# Patient Record
Sex: FEMALE | Race: WHITE | NOT HISPANIC OR LATINO | Employment: UNEMPLOYED | URBAN - NONMETROPOLITAN AREA
[De-identification: names, ages, dates, MRNs, and addresses within clinical notes are randomized per-mention and may not be internally consistent; named-entity substitution may affect disease eponyms.]

---

## 2023-04-26 ENCOUNTER — HOSPITAL ENCOUNTER (OUTPATIENT)
Dept: RADIOLOGY | Facility: HOSPITAL | Age: 58
Discharge: HOME | End: 2023-04-26
Attending: INTERNAL MEDICINE
Payer: OTHER GOVERNMENT

## 2023-04-26 DIAGNOSIS — R94.39 ABNORMAL BALLISTOCARDIOGRAM: ICD-10-CM

## 2023-04-26 PROCEDURE — 75572 CT HRT W/3D IMAGE: CPT | Mod: TC

## 2023-04-26 PROCEDURE — 25500020 PHARM REV CODE 255: Performed by: INTERNAL MEDICINE

## 2023-04-26 RX ADMIN — IOPAMIDOL 90 ML: 755 INJECTION, SOLUTION INTRAVENOUS at 10:04

## 2023-05-10 ENCOUNTER — HOSPITAL ENCOUNTER (OUTPATIENT)
Dept: RADIOLOGY | Facility: HOSPITAL | Age: 58
Discharge: HOME | End: 2023-05-10
Attending: INTERNAL MEDICINE
Payer: OTHER GOVERNMENT

## 2023-05-10 DIAGNOSIS — R42 DIZZINESS AND GIDDINESS: ICD-10-CM

## 2023-05-10 PROCEDURE — 25500020 PHARM REV CODE 255: Performed by: INTERNAL MEDICINE

## 2023-05-10 PROCEDURE — 75572 CT HRT W/3D IMAGE: CPT | Mod: TC

## 2023-05-10 RX ADMIN — IOPAMIDOL 90 ML: 755 INJECTION, SOLUTION INTRAVENOUS at 01:05

## 2023-06-30 DIAGNOSIS — R06.02 SOB (SHORTNESS OF BREATH): Primary | ICD-10-CM

## 2023-09-21 ENCOUNTER — HOSPITAL ENCOUNTER (OUTPATIENT)
Dept: RADIOLOGY | Facility: CLINIC | Age: 58
Discharge: HOME | End: 2023-09-21
Attending: INTERNAL MEDICINE
Payer: OTHER GOVERNMENT

## 2023-09-21 ENCOUNTER — CLINICAL SUPPORT (OUTPATIENT)
Dept: PULMONOLOGY | Facility: CLINIC | Age: 58
End: 2023-09-21
Payer: OTHER GOVERNMENT

## 2023-09-21 ENCOUNTER — OFFICE VISIT (OUTPATIENT)
Dept: PULMONOLOGY | Facility: CLINIC | Age: 58
End: 2023-09-21
Payer: OTHER GOVERNMENT

## 2023-09-21 VITALS
DIASTOLIC BLOOD PRESSURE: 79 MMHG | WEIGHT: 187.38 LBS | SYSTOLIC BLOOD PRESSURE: 128 MMHG | HEART RATE: 92 BPM | OXYGEN SATURATION: 97 % | HEIGHT: 63 IN | RESPIRATION RATE: 17 BRPM | BODY MASS INDEX: 33.2 KG/M2

## 2023-09-21 DIAGNOSIS — J98.4 PULMONARY SCARRING: ICD-10-CM

## 2023-09-21 DIAGNOSIS — R94.2 RESTRICTIVE PATTERN PRESENT ON PULMONARY FUNCTION TESTING: ICD-10-CM

## 2023-09-21 DIAGNOSIS — R53.83 FATIGUE, UNSPECIFIED TYPE: ICD-10-CM

## 2023-09-21 DIAGNOSIS — J47.9 FOCAL BRONCHIECTASIS: ICD-10-CM

## 2023-09-21 DIAGNOSIS — R06.02 SHORTNESS OF BREATH: Primary | ICD-10-CM

## 2023-09-21 DIAGNOSIS — R06.02 SOB (SHORTNESS OF BREATH): ICD-10-CM

## 2023-09-21 DIAGNOSIS — E66.9 OBESITY, UNSPECIFIED CLASSIFICATION, UNSPECIFIED OBESITY TYPE, UNSPECIFIED WHETHER SERIOUS COMORBIDITY PRESENT: ICD-10-CM

## 2023-09-21 DIAGNOSIS — J84.9 INTERSTITIAL PULMONARY DISEASE, UNSPECIFIED: ICD-10-CM

## 2023-09-21 DIAGNOSIS — G47.33 OBSTRUCTIVE SLEEP APNEA: ICD-10-CM

## 2023-09-21 DIAGNOSIS — R06.02 SOB (SHORTNESS OF BREATH): Primary | ICD-10-CM

## 2023-09-21 PROCEDURE — 94726 PLETHYSMOGRAPHY LUNG VOLUMES: CPT | Mod: S$GLB,,, | Performed by: INTERNAL MEDICINE

## 2023-09-21 PROCEDURE — 71046 X-RAY EXAM CHEST 2 VIEWS: CPT | Mod: TC,,, | Performed by: INTERNAL MEDICINE

## 2023-09-21 PROCEDURE — 94729 DIFFUSING CAPACITY: CPT | Mod: S$GLB,,, | Performed by: INTERNAL MEDICINE

## 2023-09-21 PROCEDURE — 71046 XR CHEST PA AND LATERAL: ICD-10-PCS | Mod: TC,,, | Performed by: INTERNAL MEDICINE

## 2023-09-21 PROCEDURE — 71046 XR CHEST PA AND LATERAL: ICD-10-PCS | Mod: 26,,, | Performed by: RADIOLOGY

## 2023-09-21 PROCEDURE — 94726 PULM FUNCT TST PLETHYSMOGRAP: ICD-10-PCS | Mod: S$GLB,,, | Performed by: INTERNAL MEDICINE

## 2023-09-21 PROCEDURE — 99205 OFFICE O/P NEW HI 60 MIN: CPT | Mod: 25,S$GLB,, | Performed by: INTERNAL MEDICINE

## 2023-09-21 PROCEDURE — 94060 PR EVAL OF BRONCHOSPASM: ICD-10-PCS | Mod: S$GLB,,, | Performed by: INTERNAL MEDICINE

## 2023-09-21 PROCEDURE — 71046 X-RAY EXAM CHEST 2 VIEWS: CPT | Mod: 26,,, | Performed by: RADIOLOGY

## 2023-09-21 PROCEDURE — 94060 EVALUATION OF WHEEZING: CPT | Mod: S$GLB,,, | Performed by: INTERNAL MEDICINE

## 2023-09-21 PROCEDURE — 94729 PR C02/MEMBANE DIFFUSE CAPACITY: ICD-10-PCS | Mod: S$GLB,,, | Performed by: INTERNAL MEDICINE

## 2023-09-21 PROCEDURE — 99205 PR OFFICE/OUTPT VISIT, NEW, LEVL V, 60-74 MIN: ICD-10-PCS | Mod: 25,S$GLB,, | Performed by: INTERNAL MEDICINE

## 2023-09-21 RX ORDER — CALCITRIOL 0.5 UG/1
1 CAPSULE ORAL 2 TIMES DAILY
COMMUNITY
Start: 2023-02-06 | End: 2024-02-07

## 2023-09-21 RX ORDER — SERTRALINE HYDROCHLORIDE 100 MG/1
150 TABLET, FILM COATED ORAL
COMMUNITY
Start: 2023-03-20

## 2023-09-21 RX ORDER — LEVOTHYROXINE SODIUM 125 UG/1
125 TABLET ORAL
COMMUNITY
Start: 2023-02-17

## 2023-09-21 RX ORDER — ATORVASTATIN CALCIUM 40 MG/1
TABLET, FILM COATED ORAL
COMMUNITY
Start: 2022-12-27 | End: 2023-12-28

## 2023-09-21 RX ORDER — HYDROXYZINE HYDROCHLORIDE 50 MG/1
100 TABLET, FILM COATED ORAL
COMMUNITY
Start: 2023-03-20

## 2023-09-21 NOTE — PROGRESS NOTES
Subjective:    Patient Identification:   Patient ID: Hortencia Negro is a 58 y.o. female.    Referring Provider:  [facility]    Chief Complaint:  Shortness of breath, chronic    History of Present Illness:    Hortencia Negro is a 58 y.o. female who presents for the evaluation of chronic shortness of breath that started more than a year ago.  She used to walk about 2-4 miles without trouble but since last 1-1/2 year she had trouble walking.  She had her both menisci of her bilateral knees repaired but the right 1 cause some issues.  Only other medical problems were hyperlipidemia and history of thyroid cancer status post resection and now on levothyroxine.  She mentions that she had a home sleep study to Dr. Mark Gates's office and was told that she had sleep apnea but did not need therapy.  I do not have access to that record or sleep study.  She had thorough workup with Dr. Ritter in [place] and was told her heart was normal.  I do not have access to stress test or echocardiogram.  This all started with dizzy spells.  Right now she is having shortness of breath on exertion or even rest sometimes even without associated dizziness on rest.  She denies any history of asthma.  Last time she had pneumonia when she was 14 years old.  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  No exposure to asbestos as per patient.  She had worked in [employer] and then [employer].  She has never smoked but probably had secondhand tobacco exposure.  She denies any cough, wheezing, chest pain, leg swelling except related to her right knee issues.  No history of blood clots or miscarriage.  No symptoms or signs suggestive of connective tissue disease except some nonhealing ulcers on right lower extremity.  Denies any personal or family history of sarcoidosis.  Denies any weight loss, loss of appetite, fever etcetera.  Had some night sweats which she relates to her postmenopausal state.  Feels tired even after taking a  shower and had to sit and catch her breath.  Does take naps.  Mallampati class is 4.  BMI is 33.19 kilos per m2.  Does not snore or has been told that she snores loudly but has been told about witnessed apneas.    Review of Systems:  Review of Systems   Constitutional:  Positive for fatigue. Negative for fever, chills, weight loss, weight gain, activity change, appetite change, night sweats and weakness.   HENT:  Negative for nosebleeds, postnasal drip, rhinorrhea, sinus pressure, sore throat, trouble swallowing, voice change, congestion, ear pain and hearing loss.    Eyes:  Negative for redness and itching.   Respiratory:  Positive for apnea, shortness of breath, dyspnea on extertion and somnolence. Negative for cough, hemoptysis, sputum production, choking, chest tightness, wheezing, orthopnea, previous hospitialization due to pulmonary problems, asthma nighttime symptoms, pleurisy, use of rescue inhaler and Paroxysmal Nocturnal Dyspnea.    Cardiovascular:  Negative for chest pain, palpitations and leg swelling.   Genitourinary:  Negative for difficulty urinating and hematuria.   Endocrine:  Negative for polydipsia, polyphagia, cold intolerance, heat intolerance and polyuria.    Musculoskeletal:  Negative for arthralgias, back pain, gait problem, joint swelling and myalgias.   Skin:  Negative for rash.   Gastrointestinal:  Negative for nausea, vomiting, abdominal pain, abdominal distention and acid reflux.   Neurological:  Negative for dizziness, syncope, weakness, light-headedness and headaches.   Hematological:  Negative for adenopathy. Does not bruise/bleed easily and no excessive bruising.   Psychiatric/Behavioral:  Negative for confusion and sleep disturbance. The patient is not nervous/anxious.          Allergies: Review of patient's allergies indicates:  No Known Allergies    Medications:      Past Medical History:      Past Medical History:   Diagnosis Date    Depression     Mixed hyperlipidemia      Thyroid disease        Family History:      History reviewed. No pertinent family history.     Social History:      History reviewed. No pertinent surgical history.    Physical Exam:  Vitals:    09/21/23 0912   BP: 128/79   Pulse: 92   Resp: 17     Physical Exam   Constitutional: She is oriented to person, place, and time. She appears not cachectic. No distress. She is obese.   HENT:   Head: Normocephalic.   Right Ear: External ear normal.   Left Ear: External ear normal.   Nose: Nose normal. No mucosal edema. No polyps.   Mouth/Throat: Oropharynx is clear and moist. Normal dentition. No oropharyngeal exudate. Mallampati Score: IV.   Neck: No JVD present. No tracheal deviation present. No thyromegaly present.   Cardiovascular: Normal rate, regular rhythm, normal heart sounds and intact distal pulses. Exam reveals no gallop and no friction rub.   No murmur heard.  Pulmonary/Chest: Normal expansion, symmetric chest wall expansion, effort normal and breath sounds normal. No stridor. No respiratory distress. She has no decreased breath sounds. She has no wheezes. She has no rhonchi. She has no rales. Chest wall is not dull to percussion. She exhibits no tenderness. Negative for egophony. Negative for tactile fremitus.   Abdominal: Soft. Bowel sounds are normal. She exhibits no distension and no mass. There is no hepatosplenomegaly. There is no abdominal tenderness. There is no rebound and no guarding. No hernia.   Musculoskeletal:         General: No tenderness, deformity or edema. Normal range of motion.      Cervical back: Normal range of motion and neck supple.   Lymphadenopathy: No supraclavicular adenopathy is present.     She has no cervical adenopathy.     She has no axillary adenopathy.   Neurological: She is alert and oriented to person, place, and time. She has normal reflexes. She displays normal reflexes. No cranial nerve deficit. She exhibits normal muscle tone.   Skin: Skin is warm and dry. No rash noted.  She is not diaphoretic. No cyanosis or erythema. No pallor. Nails show no clubbing.   Psychiatric: She has a normal mood and affect. Her behavior is normal. Judgment and thought content normal.              Accessory Clinical Data:  Chest x-ray:  Nothing acute on her chest x-ray from this morning on my review    CT scan:  Reviewed the CT chest report from June 2022 and June 2023.  Report for the recent CT chest shows left lower lobe stable bronchiectasis and biapical scarring.  Previously described 2 mm pulmonary nodule in left upper lobe was not noted on this study.  Subpleural nodule in the lingula was stable.  Stable 4 mm left lower lobe anterior pulmonary nodule.    PFTs:  Mild restriction with FEV1 of 83% predicted and a TLC of 73% predicted.  FVC is 68% predicted.  Clinically significant bronchodilator response not seen.  Diffusion capacity is moderately reduced to 48% predicted but corrects for alveolar volume to 91%.    6MWT:     TTE:    Lab data:    All radiographic imaging of the chest, PFT tracings/data, and 6MWT data have been independently reviewed and interpreted unless otherwise specified.     Assessment and Plan:        Problem List Items Addressed This Visit    None  Visit Diagnoses       Shortness of breath    -  Primary    Relevant Orders    Stress test, pulmonary    Focal bronchiectasis        Pulmonary scarring        Obesity, unspecified classification, unspecified obesity type, unspecified whether serious comorbidity present        Obstructive sleep apnea        Fatigue, unspecified type        Restrictive pattern present on pulmonary function testing               Orders Placed This Encounter   Procedures    Stress test, pulmonary     Standing Status:   Future     Standing Expiration Date:   9/21/2024     Order Specific Question:   Reason for study     Answer:   Functional status      Obtain high-resolution CT scan chest given restrictive pattern on PFTs and to further evaluate bronchiectasis  and biapical scarring.  May need bronchoscopy to rule out smoldering infection such as nontuberculous mycobacteria.    Cost for biapical scarring is unclear.  Patient denies history of exposure to asbestos.  Diffusion capacity is significantly reduced with last known hemoglobin of 13.4.  Retrieve home sleep study records from Dr. Mark Gates's office.  Patient mentions that she was told she had sleep apnea but it did not require treatment.  Patient is showing symptoms of fatigue and sleepiness during the daytime.  If she truly has sleep apnea this would need to be treated.  Retrieve echocardiogram report from Dr. Barr's office.  I would like to look at RV size/function and pressures to rule out pulmonary hypertension.    More than 50% of 60 min time was spent face-to-face on counseling, in reviewing the imaging studies, reviewing notes from primary care provider, performing appropriate examination, counseling and educating the patient regarding the findings on CT scan, ordering appropriate follow-up imaging studies, documenting clinical information in the electronic medical record, care coordination as well as communicating with the primary referring physician.      Follow up in about 4 months (around 1/21/2024).  Thank you very much for involving me in the care of this patient.  Please do not hesitate to reach me if you have any further questions or concerns.    This note is dictated on M*Modal word recognition program.  There are word recognition mistakes that are occasionally missed on review.

## 2024-03-28 ENCOUNTER — TELEPHONE (OUTPATIENT)
Dept: PULMONOLOGY | Facility: CLINIC | Age: 59
End: 2024-03-28
Payer: OTHER GOVERNMENT

## 2024-03-28 NOTE — TELEPHONE ENCOUNTER
Return call and spoke with patient. LB  ----- Message from Elizabeth Feldman sent at 3/28/2024 10:20 AM CDT -----  Contact: Patient  Patient called to consult with nurse or staff regarding her upcoming appointments for 4/4. She wanted to speak with clinic to reschedule and would like a call back. She can be reached at [phone]. Thanks/MR

## 2024-04-04 ENCOUNTER — CLINICAL SUPPORT (OUTPATIENT)
Dept: PULMONOLOGY | Facility: CLINIC | Age: 59
End: 2024-04-04
Payer: OTHER GOVERNMENT

## 2024-04-04 ENCOUNTER — OFFICE VISIT (OUTPATIENT)
Dept: PULMONOLOGY | Facility: CLINIC | Age: 59
End: 2024-04-04
Payer: OTHER GOVERNMENT

## 2024-04-04 VITALS
DIASTOLIC BLOOD PRESSURE: 70 MMHG | BODY MASS INDEX: 33.2 KG/M2 | RESPIRATION RATE: 16 BRPM | SYSTOLIC BLOOD PRESSURE: 100 MMHG | HEIGHT: 63 IN | HEART RATE: 84 BPM | HEIGHT: 63 IN | BODY MASS INDEX: 31.71 KG/M2 | WEIGHT: 179 LBS | WEIGHT: 187.38 LBS | OXYGEN SATURATION: 97 %

## 2024-04-04 DIAGNOSIS — R91.8 MULTIPLE PULMONARY NODULES: ICD-10-CM

## 2024-04-04 DIAGNOSIS — R06.02 SHORTNESS OF BREATH: ICD-10-CM

## 2024-04-04 DIAGNOSIS — G47.33 OBSTRUCTIVE SLEEP APNEA (ADULT) (PEDIATRIC): ICD-10-CM

## 2024-04-04 DIAGNOSIS — J98.4 RESTRICTIVE LUNG DISEASE: Primary | ICD-10-CM

## 2024-04-04 PROCEDURE — 94618 PULMONARY STRESS TESTING: CPT | Mod: S$GLB,,, | Performed by: INTERNAL MEDICINE

## 2024-04-04 PROCEDURE — 99215 OFFICE O/P EST HI 40 MIN: CPT | Mod: 25,S$GLB,,

## 2024-04-04 NOTE — PROGRESS NOTES
Subjective:    Patient Identification:  Patient ID: Hortencia Negro is a 59 y.o. female.    Referring Provider:  No ref. provider found     Chief Complaint:  Shortness of Breath      History of Present Illness:    Hortencia Negro is a 59 y.o. female who presents for a follow-up visit of mild restrictive lung disease with a 6 minute walk test and follow up HRCT results.  She has not yet done HRCT.  I have reviewed the records for her home sleep test showing mild LATHA with an AHI of 5.6.  Her echocardiogram from her cardiologist in [place] showed a normal RV size and RVSP.  6 minute walk test did not show any exertional hypoxia..  Denies any shortness of breath or chronic productive cough.  Her main concern is that she has excessive daytime fatigue.  Her ESS today is 8.  Supposedly she was given a CPAP machine but she sent it back because she could not tolerate wearing it.  She says that her sleep schedule his inconsistent due to the way that she works and she only sleeps for about 2 hours at a time.    Review of Systems:  Review of Systems   Constitutional:  Negative for fever, chills, weight loss, weight gain, activity change, appetite change, fatigue, night sweats and weakness.   HENT:  Negative for nosebleeds, postnasal drip, rhinorrhea, sinus pressure, sore throat, trouble swallowing, voice change, congestion, ear pain and hearing loss.    Eyes:  Negative for redness and itching.   Respiratory:  Positive for snoring and somnolence. Negative for cough, hemoptysis, sputum production, choking, chest tightness, shortness of breath, wheezing, orthopnea, previous hospitialization due to pulmonary problems, asthma nighttime symptoms, pleurisy, dyspnea on extertion, use of rescue inhaler and Paroxysmal Nocturnal Dyspnea.    Cardiovascular:  Negative for chest pain, palpitations and leg swelling.   Genitourinary:  Negative for difficulty urinating and hematuria.   Endocrine:  Negative for polydipsia, polyphagia, cold  "intolerance, heat intolerance and polyuria.    Musculoskeletal:  Negative for arthralgias, back pain, gait problem, joint swelling and myalgias.   Skin:  Negative for rash.   Gastrointestinal:  Negative for nausea, vomiting, abdominal pain, abdominal distention and acid reflux.   Neurological:  Negative for dizziness, syncope, weakness, light-headedness and headaches.   Hematological:  Negative for adenopathy. Does not bruise/bleed easily and no excessive bruising.   Psychiatric/Behavioral:  Negative for confusion and sleep disturbance. The patient is not nervous/anxious.        Allergies: Review of patient's allergies indicates:  No Known Allergies    Medications:      Past Medical History:      Past Medical History:   Diagnosis Date    Depression     Mixed hyperlipidemia     Thyroid disease        Family History:      No family history on file.     Social History:      No past surgical history on file.    Physical Exam:  /70 (BP Location: Right arm, Patient Position: Sitting, BP Method: Large (Automatic))   Pulse 84   Resp 16   Ht 5' 3" (1.6 m)   Wt 81.2 kg (179 lb)   SpO2 97%   BMI 31.71 kg/m²   Physical Exam   Constitutional: She is oriented to person, place, and time. She appears not cachectic. No distress.   HENT:   Head: Normocephalic.   Right Ear: External ear normal.   Left Ear: External ear normal.   Nose: Nose normal. No mucosal edema. No polyps.   Mouth/Throat: Oropharynx is clear and moist. Normal dentition. No oropharyngeal exudate. Mallampati Score: III.   Neck: No JVD present. No tracheal deviation present. No thyromegaly present.   Cardiovascular: Normal rate, regular rhythm, normal heart sounds and intact distal pulses. Exam reveals no gallop and no friction rub.   No murmur heard.  Pulmonary/Chest: Normal expansion, symmetric chest wall expansion, effort normal and breath sounds normal. No stridor. No respiratory distress. She has no decreased breath sounds. She has no wheezes. She has " "no rhonchi. She has no rales. Chest wall is not dull to percussion. She exhibits no tenderness. Negative for egophony. Negative for tactile fremitus.   Abdominal: Soft. Bowel sounds are normal. She exhibits no distension and no mass. There is no hepatosplenomegaly. There is no abdominal tenderness. There is no rebound and no guarding. No hernia.   Musculoskeletal:         General: No tenderness, deformity or edema. Normal range of motion.      Cervical back: Normal range of motion and neck supple.   Lymphadenopathy: No supraclavicular adenopathy is present.     She has no cervical adenopathy.     She has no axillary adenopathy.   Neurological: She is alert and oriented to person, place, and time. She has normal reflexes. She displays normal reflexes. No cranial nerve deficit. She exhibits normal muscle tone.   Skin: Skin is warm and dry. No rash noted. She is not diaphoretic. No cyanosis or erythema. No pallor. Nails show no clubbing.   Psychiatric: She has a normal mood and affect. Her behavior is normal. Judgment and thought content normal.           No results found for: "PREFEV1", "BYO6LUDDFR", "PREFVC", "FVCPREREF", "KDODBQ4ISA", "ODF7QXBPEHV", "VAWT0HYS", "SSXE6QQX", "PREDLCO", "DLCOSBPRRF", "DLCOADJPRE", "DLCOCSBRPRRF", "POSTFEV1", "POSTFVC", "VPFJAQI5VSL"   X-Ray Chest PA And Lateral  Narrative: EXAMINATION:  XR CHEST PA AND LATERAL    CLINICAL HISTORY:  Shortness of breath    TECHNIQUE:  PA and lateral views of the chest were performed.    COMPARISON:  None    FINDINGS:  Cardiac silhouette and mediastinal contours are normal.  Lungs are clear.  Osseous structures are intact.  Impression: No acute cardiopulmonary process.    Electronically signed by: Francis Truong MD  Date:    09/21/2023  Time:    08:59           Accessory Clinical Data:  Chest x-ray:    CT scan:  CT from June 2022 and June 2023 showing left lower lobe stable bronchiectasis and biapical scarring.  Previously described 2 mm nodule in the " left upper lobe was not noted on these studies.  Subpleural nodule in the lingula was stable.  Stable 4 mm left lower lobe anterior pulmonary nodule.    PFTs:     6MWT:     TTE:  LV size is normal.  Global left ventricular systolic function is normal.  The left ventricular ejection fraction is 65-70%.  Left ventricular diastolic function is normal.  The right ventricle is normal in size.  Right ventricular systolic function is normal.  There is mild mitral regurgitation, mild tricuspid regurgitation, mild pulmonic regurgitation.  The estimated pulmonary artery systolic pressure is 30 mmHg assuming a right atrial pressure of 3 mmHg    Sleep study from 08/03/2022 showing mild obstructive sleep apnea with an AHI of 5.6.    All radiographic imaging of the chest, PFT tracings/data, and 6MWT data have been independently reviewed and interpreted unless otherwise specified.     Assessment and Plan:      Problem List Items Addressed This Visit          Pulmonary    Multiple pulmonary nodules    Restrictive lung disease - Primary    Current Assessment & Plan     Needs HRCT to further evaluate biapical scarring.   No exertional hypoxia on 6MWT.              Other    Obstructive sleep apnea (adult) (pediatric)    Current Assessment & Plan     HST with AHI 5.6, not on CPAP. No evidence of RV strain on most recent echo.    Send referral for inspire evaluation.    We discussed proper sleep hygiene.           No orders of the defined types were placed in this encounter.         High complexity case.  60 min were spent in reviewing the important data including imaging studies on a different EMR, laboratory data, notes from other consultants and reviewing care with other providers with more than 50% of the time spent face-to-face on counseling, explaining the disease process, progression, importance of compliance with prescribed medications and the importance of follow-up.    Follow up for HRCT results with Dr. Solano.

## 2024-04-04 NOTE — ASSESSMENT & PLAN NOTE
HST with AHI 5.6, not on CPAP. No evidence of RV strain on most recent echo.    Send referral for inspire evaluation.    We discussed proper sleep hygiene.

## 2024-04-04 NOTE — PROCEDURES
"[facility] - Pulmonary Function  Six Minute Walk     SUMMARY     Ordering Provider: Dr. Solano   Interpreting Provider: Dr. Solano  Performing nurse/tech/RT: BK Muñiz RRT  Diagnosis: Shortness of Breath  Height: 5' 3" (160 cm)  Weight: 85 kg (187 lb 6.3 oz)  BMI (Calculated): 33.2   Patient Race:             Phase Oxygen Assessment Supplemental O2 Heart   Rate Blood Pressure Juarez Dyspnea Scale Rating   Resting 98 % Room Air 65 bpm 121/71 1   Exercise        Minute        1 96 % Room Air 90 bpm     2 95 % Room Air 107 bpm     3 96 % Room Air 105 bpm     4 94 % Room Air 116 bpm     5 96 % Room Air 115 bpm     6  96 % Room Air 120 bpm 118/64 2   Recovery        Minute        1 96 % Room Air 108 bpm     2 96 % Room Air 85 bpm     3 97 % Room Air 74 bpm     4 98 % Room Air 68 bpm 100/70 0     Six Minute Walk Summary  6MWT Status: completed without stopping  Patient Reported: Dyspnea     Interpretation:  Did the patient stop or pause?: No                                         Total Time Walked (Calculated): 360 seconds  Final Partial Lap Distance (feet): 175 feet  Total Distance Meters (Calculated): 419.1 meters  Predicted Distance Meters (Calculated): 457.37 meters  Percentage of Predicted (Calculated): 91.63  Peak VO2 (Calculated): 16.55  Mets: 4.73  Has The Patient Had a Previous Six Minute Walk Test?: No       Previous 6MWT Results  Has The Patient Had a Previous Six Minute Walk Test?: No    Physician interpretation:     No exertional hypoxia was noted during this 6 minute walk test.    Blood pressure aleshia from 65 at baseline to 120 beats per minute at the end of the exercise.    No significant change in blood pressure was noted.    Juarez score was 1 at baseline and aleshia to 2 at the end of the exercise.    Patient walked 419 m which is 91% of predicted distance in 6 minutes.    "